# Patient Record
Sex: MALE | Race: WHITE
[De-identification: names, ages, dates, MRNs, and addresses within clinical notes are randomized per-mention and may not be internally consistent; named-entity substitution may affect disease eponyms.]

---

## 2019-12-07 ENCOUNTER — HOSPITAL ENCOUNTER (OUTPATIENT)
Dept: HOSPITAL 47 - LABMAIN | Age: 24
Discharge: HOME | End: 2019-12-07
Attending: EMERGENCY MEDICINE
Payer: COMMERCIAL

## 2019-12-07 DIAGNOSIS — R11.0: ICD-10-CM

## 2019-12-07 DIAGNOSIS — R53.83: ICD-10-CM

## 2019-12-07 DIAGNOSIS — R61: Primary | ICD-10-CM

## 2019-12-07 LAB
ALBUMIN SERPL-MCNC: 4.3 G/DL (ref 3.8–4.9)
ALBUMIN/GLOB SERPL: 1.87 G/DL (ref 1.6–3.17)
ALP SERPL-CCNC: 425 U/L (ref 41–126)
ALT SERPL-CCNC: 842 U/L (ref 10–49)
AMYLASE SERPL-CCNC: 52 U/L (ref 23–121)
ANION GAP SERPL CALC-SCNC: 14.2 MMOL/L (ref 4–12)
AST SERPL-CCNC: 337 U/L (ref 14–35)
BUN SERPL-SCNC: 12 MG/DL (ref 9–27)
BUN/CREAT SERPL: 15 RATIO (ref 12–20)
CALCIUM SPEC-MCNC: 9.1 MG/DL (ref 8.7–10.3)
CELLS COUNTED: 100
CHLORIDE SERPL-SCNC: 102 MMOL/L (ref 96–109)
CK SERPL-CCNC: 44 U/L (ref 35–257)
CO2 SERPL-SCNC: 22.8 MMOL/L (ref 21.6–31.8)
EOSINOPHIL # BLD MANUAL: 0.09 K/UL (ref 0–0.7)
ERYTHROCYTE [DISTWIDTH] IN BLOOD BY AUTOMATED COUNT: 5.26 M/UL (ref 4.3–5.9)
ERYTHROCYTE [DISTWIDTH] IN BLOOD: 13.2 % (ref 11.5–15.5)
GLOBULIN SER CALC-MCNC: 2.3 G/DL (ref 1.6–3.3)
GLUCOSE SERPL-MCNC: 95 MG/DL (ref 70–110)
HCT VFR BLD AUTO: 48.1 % (ref 39–53)
HGB BLD-MCNC: 16.2 GM/DL (ref 13–17.5)
LYMPHOCYTES # BLD MANUAL: 6.36 K/UL (ref 1–4.8)
MAGNESIUM SPEC-SCNC: 2 MG/DL (ref 1.5–2.4)
MCH RBC QN AUTO: 30.7 PG (ref 25–35)
MCHC RBC AUTO-ENTMCNC: 33.6 G/DL (ref 31–37)
MCV RBC AUTO: 91.4 FL (ref 80–100)
MONOCYTES # BLD MANUAL: 0.26 K/UL (ref 0–1)
NEUTROPHILS NFR BLD MANUAL: 22 %
NEUTS SEG # BLD MANUAL: 1.89 K/UL (ref 1.3–7.7)
PH UR: 5.5 [PH] (ref 5–8)
PLATELET # BLD AUTO: 197 K/UL (ref 150–450)
POTASSIUM SERPL-SCNC: 4.5 MMOL/L (ref 3.5–5.5)
PROT SERPL-MCNC: 6.6 G/DL (ref 6.2–8.2)
SODIUM SERPL-SCNC: 139 MMOL/L (ref 135–145)
SP GR UR: 1.01 (ref 1–1.03)
UROBILINOGEN UR QL STRIP: <2 MG/DL (ref ?–2)
WBC # BLD AUTO: 8.6 K/UL (ref 3.8–10.6)

## 2019-12-07 PROCEDURE — 80053 COMPREHEN METABOLIC PANEL: CPT

## 2019-12-07 PROCEDURE — 82550 ASSAY OF CK (CPK): CPT

## 2019-12-07 PROCEDURE — 36415 COLL VENOUS BLD VENIPUNCTURE: CPT

## 2019-12-07 PROCEDURE — 81003 URINALYSIS AUTO W/O SCOPE: CPT

## 2019-12-07 PROCEDURE — 83690 ASSAY OF LIPASE: CPT

## 2019-12-07 PROCEDURE — 85025 COMPLETE CBC W/AUTO DIFF WBC: CPT

## 2019-12-07 PROCEDURE — 86140 C-REACTIVE PROTEIN: CPT

## 2019-12-07 PROCEDURE — 83735 ASSAY OF MAGNESIUM: CPT

## 2019-12-07 PROCEDURE — 87040 BLOOD CULTURE FOR BACTERIA: CPT

## 2019-12-07 PROCEDURE — 80074 ACUTE HEPATITIS PANEL: CPT

## 2019-12-07 PROCEDURE — 86308 HETEROPHILE ANTIBODY SCREEN: CPT

## 2019-12-07 PROCEDURE — 71046 X-RAY EXAM CHEST 2 VIEWS: CPT

## 2019-12-07 PROCEDURE — 82150 ASSAY OF AMYLASE: CPT

## 2019-12-07 NOTE — XR
EXAMINATION TYPE: XR chest 2V

 

DATE OF EXAM: 12/7/2019

 

COMPARISON: NONE

 

TECHNIQUE: PA and lateral views submitted.

 

HISTORY: Night sweats

 

FINDINGS:

The lungs are clear and  there is no pneumothorax, pleural effusion, or focal pneumonia.  Mild promin
ence the pulmonary arteries. No overt failure.

 

IMPRESSION: 

1. Mild prominence the hilum may reflect prominent pulmonary arteries. If there is concern for adenop
athy correlate with CT scan of the chest.

2. No acute consolidative pneumonia..